# Patient Record
Sex: FEMALE | Race: OTHER | HISPANIC OR LATINO | Employment: UNEMPLOYED | URBAN - METROPOLITAN AREA
[De-identification: names, ages, dates, MRNs, and addresses within clinical notes are randomized per-mention and may not be internally consistent; named-entity substitution may affect disease eponyms.]

---
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
## 2021-02-04 ENCOUNTER — TELEPHONE (OUTPATIENT)
Dept: PEDIATRICS CLINIC | Facility: CLINIC | Age: 21
End: 2021-02-04

## 2021-02-16 NOTE — TELEPHONE ENCOUNTER
02/15/21 7:41 PM     Thank you for your request  Your request has been received, reviewed, and the patient chart updated  The PCP has successfully been removed with a patient attribution note  This message will now be completed      Thank you  Izabella Jacome

## 2022-11-01 ENCOUNTER — APPOINTMENT (OUTPATIENT)
Dept: LAB | Facility: CLINIC | Age: 22
End: 2022-11-01

## 2022-11-01 ENCOUNTER — OFFICE VISIT (OUTPATIENT)
Dept: FAMILY MEDICINE CLINIC | Facility: CLINIC | Age: 22
End: 2022-11-01

## 2022-11-01 VITALS
SYSTOLIC BLOOD PRESSURE: 120 MMHG | RESPIRATION RATE: 18 BRPM | DIASTOLIC BLOOD PRESSURE: 80 MMHG | HEIGHT: 61 IN | OXYGEN SATURATION: 98 % | WEIGHT: 270.6 LBS | BODY MASS INDEX: 51.09 KG/M2 | TEMPERATURE: 97.8 F | HEART RATE: 97 BPM

## 2022-11-01 DIAGNOSIS — Z76.89 ENCOUNTER TO ESTABLISH CARE: Primary | ICD-10-CM

## 2022-11-01 DIAGNOSIS — Z01.818 PRE-OP EXAMINATION: ICD-10-CM

## 2022-11-01 DIAGNOSIS — N92.6 IRREGULAR MENSES: ICD-10-CM

## 2022-11-01 DIAGNOSIS — E66.01 CLASS 3 SEVERE OBESITY DUE TO EXCESS CALORIES WITHOUT SERIOUS COMORBIDITY WITH BODY MASS INDEX (BMI) OF 50.0 TO 59.9 IN ADULT (HCC): ICD-10-CM

## 2022-11-01 DIAGNOSIS — Z11.4 SCREENING FOR HIV (HUMAN IMMUNODEFICIENCY VIRUS): ICD-10-CM

## 2022-11-01 DIAGNOSIS — Z11.59 NEED FOR HEPATITIS C SCREENING TEST: ICD-10-CM

## 2022-11-01 DIAGNOSIS — N92.6 MISSED MENSES: ICD-10-CM

## 2022-11-01 LAB
ALBUMIN SERPL BCP-MCNC: 3.3 G/DL (ref 3.5–5)
ALP SERPL-CCNC: 74 U/L (ref 46–116)
ALT SERPL W P-5'-P-CCNC: 29 U/L (ref 12–78)
ANION GAP SERPL CALCULATED.3IONS-SCNC: 5 MMOL/L (ref 4–13)
AST SERPL W P-5'-P-CCNC: 16 U/L (ref 5–45)
BASOPHILS # BLD AUTO: 0.02 THOUSANDS/ÂΜL (ref 0–0.1)
BASOPHILS NFR BLD AUTO: 0 % (ref 0–1)
BILIRUB SERPL-MCNC: 0.24 MG/DL (ref 0.2–1)
BUN SERPL-MCNC: 8 MG/DL (ref 5–25)
CALCIUM ALBUM COR SERPL-MCNC: 9.4 MG/DL (ref 8.3–10.1)
CALCIUM SERPL-MCNC: 8.8 MG/DL (ref 8.3–10.1)
CHLORIDE SERPL-SCNC: 109 MMOL/L (ref 96–108)
CHOLEST SERPL-MCNC: 140 MG/DL
CO2 SERPL-SCNC: 26 MMOL/L (ref 21–32)
CREAT SERPL-MCNC: 0.73 MG/DL (ref 0.6–1.3)
EOSINOPHIL # BLD AUTO: 0.12 THOUSAND/ÂΜL (ref 0–0.61)
EOSINOPHIL NFR BLD AUTO: 2 % (ref 0–6)
ERYTHROCYTE [DISTWIDTH] IN BLOOD BY AUTOMATED COUNT: 14.8 % (ref 11.6–15.1)
EST. AVERAGE GLUCOSE BLD GHB EST-MCNC: 114 MG/DL
GFR SERPL CREATININE-BSD FRML MDRD: 118 ML/MIN/1.73SQ M
GLUCOSE P FAST SERPL-MCNC: 79 MG/DL (ref 65–99)
HBA1C MFR BLD: 5.6 %
HCT VFR BLD AUTO: 41.6 % (ref 34.8–46.1)
HCV AB SER QL: NORMAL
HDLC SERPL-MCNC: 46 MG/DL
HGB BLD-MCNC: 12.9 G/DL (ref 11.5–15.4)
IMM GRANULOCYTES # BLD AUTO: 0.02 THOUSAND/UL (ref 0–0.2)
IMM GRANULOCYTES NFR BLD AUTO: 0 % (ref 0–2)
LDLC SERPL CALC-MCNC: 75 MG/DL (ref 0–100)
LYMPHOCYTES # BLD AUTO: 2.1 THOUSANDS/ÂΜL (ref 0.6–4.47)
LYMPHOCYTES NFR BLD AUTO: 32 % (ref 14–44)
MCH RBC QN AUTO: 25 PG (ref 26.8–34.3)
MCHC RBC AUTO-ENTMCNC: 31 G/DL (ref 31.4–37.4)
MCV RBC AUTO: 81 FL (ref 82–98)
MONOCYTES # BLD AUTO: 0.67 THOUSAND/ÂΜL (ref 0.17–1.22)
MONOCYTES NFR BLD AUTO: 10 % (ref 4–12)
NEUTROPHILS # BLD AUTO: 3.65 THOUSANDS/ÂΜL (ref 1.85–7.62)
NEUTS SEG NFR BLD AUTO: 56 % (ref 43–75)
NONHDLC SERPL-MCNC: 94 MG/DL
NRBC BLD AUTO-RTO: 0 /100 WBCS
PLATELET # BLD AUTO: 407 THOUSANDS/UL (ref 149–390)
PMV BLD AUTO: 10.2 FL (ref 8.9–12.7)
POTASSIUM SERPL-SCNC: 3.9 MMOL/L (ref 3.5–5.3)
PROT SERPL-MCNC: 7.9 G/DL (ref 6.4–8.4)
RBC # BLD AUTO: 5.15 MILLION/UL (ref 3.81–5.12)
SL AMB POCT URINE HCG: NEGATIVE
SODIUM SERPL-SCNC: 140 MMOL/L (ref 135–147)
T4 FREE SERPL-MCNC: 0.93 NG/DL (ref 0.76–1.46)
TRIGL SERPL-MCNC: 95 MG/DL
TSH SERPL DL<=0.05 MIU/L-ACNC: 4.58 UIU/ML (ref 0.45–4.5)
WBC # BLD AUTO: 6.58 THOUSAND/UL (ref 4.31–10.16)

## 2022-11-01 RX ORDER — ACETAMINOPHEN 500 MG
500 TABLET ORAL EVERY 6 HOURS PRN
COMMUNITY

## 2022-11-01 NOTE — ASSESSMENT & PLAN NOTE
Pt pursuing gastric sleeve procedure with Dr Xavier Ornelas on 11/28/2022    - No known contraindications for surgery  - Complete ordered labs today

## 2022-11-01 NOTE — PROGRESS NOTES
FAMILY PRACTICE PRE-OPERATIVE EVALUATION  [facility] PHYSICIAN [facility] PRACTICE [facility]     NAME: Christos Coats  AGE: 25 y o  SEX: female  : 2000                 DATE: 2022     Family Practice Pre-Operative Evaluation      Chief Complaint: Pre-operative Evaluation     Surgery: Laparoscopic Sleeve Gastrectomy  Anticipated Date of Surgery: 2022  Referring Provider: Zuleyma Werner MD     History of Present Illness:      Christos Coats is a 25 y o  female who presents to the office today for a preoperative consultation at the request of surgeon, Zuleyma Werner MD     This is the pt's first visit to this office  She was previously following with the [facility]  Pt is scheduled for a laparoscopic sleeve gastrectomy procedure  Planned anesthesia is general  Patient has a bleeding risk of: no recent abnormal bleeding, no remote history of abnormal bleeding and no use of Ca-channel blockers  Patient does not have objections to receiving blood products if needed  Current anti-platelet/anti-coagulation medications that the patient is prescribed includes: none  Pt voices no concerns regarding the procedure, but mentions that her periods are very infrequent, about 100 day cycles according to her phone [place]  Last reported sexual intercourse in March of this year, POCT HCG in office today was negative        Assessment of Cardiac Risk:  · Denies unstable or severe angina or MI in the last 6 weeks or history of stent placement in the last year   · Denies decompensated heart failure (e g  New onset heart failure, NYHA functional class IV heart failure, or worsening existing heart failure)  · Denies significant arrhythmias such as high grade AV block, symptomatic ventricular arrhythmia, newly recognized ventricular tachycardia, supraventricular tachycardia with resting heart rate >100, or symptomatic bradycardia  · Denies severe heart valve disease including aortic stenosis or symptomatic mitral stenosis     Exercise Capacity:  · Able to walk 4 blocks without symptoms?: Yes  · Able to walk 2 flights without symptoms?: Yes     Prior Anesthesia Reactions: No     Personal history of venous thromboembolic disease? No     History of steroid use for >2 weeks within last year? No           Review of Systems:      Review of Systems   Constitutional: Negative for chills, fatigue, fever and unexpected weight change  HENT: Negative for trouble swallowing  Eyes: Negative for visual disturbance  Respiratory: Negative for cough, chest tightness, shortness of breath and wheezing  Cardiovascular: Negative for chest pain, palpitations and leg swelling  Gastrointestinal: Negative for abdominal pain, blood in stool, constipation, diarrhea, nausea and vomiting  Endocrine: Negative for polyuria  Genitourinary: Positive for menstrual problem  Negative for difficulty urinating and dysuria  Allergic/Immunologic: Negative for environmental allergies  Neurological: Negative for dizziness, seizures, syncope, weakness, numbness and headaches  Psychiatric/Behavioral: Negative for dysphoric mood and sleep disturbance  The patient is not nervous/anxious  All other systems reviewed and are negative         Current Problem List:      Active Ambulatory Problems     Diagnosis Date Noted   • Childhood obesity 04/06/2016   • Irregular menses 11/01/2022   • Class 3 severe obesity due to excess calories without serious comorbidity with body mass index (BMI) of 50 0 to 59 9 in adult [facility]) 11/01/2022   • Pre-op examination 11/01/2022     Resolved Ambulatory Problems     Diagnosis Date Noted   • No Resolved Ambulatory Problems     Past Medical History:   Diagnosis Date   • Fatty liver           Allergies:      No Known Allergies       Current Medications:        Current Outpatient Medications:   •  acetaminophen (TYLENOL) 500 mg tablet, Take 500 mg by mouth every 6 (six) hours as needed for mild pain, Disp: , Rfl:   •  BIOTIN PO, Take by mouth, Disp: , Rfl:          Past Medical History:     Past Medical History:   Diagnosis Date   • Fatty liver          Past Surgical History:   Procedure Laterality Date   • UPPER GASTROINTESTINAL ENDOSCOPY           Family History   Problem Relation Age of Onset   • No Known Problems Mother    • No Known Problems Father    • No Known Problems Sister    • Diabetes Maternal Grandmother    • Diabetes Cousin         Social History     Socioeconomic History   • Marital status: Single     Spouse name: None   • Number of children: 0   • Years of education: None   • Highest education level: None   Occupational History   • None   Tobacco Use   • Smoking status: Never Smoker   • Smokeless tobacco: Never Used   Vaping Use   • Vaping Use: Never used   Substance and Sexual Activity   • Alcohol use: Yes     Comment: rarely   • Drug use: Never   • Sexual activity: Not Currently     Partners: Male     Birth control/protection: Condom Male   Other Topics Concern   • None   Social History Narrative   • None     Social Determinants of Health     Financial Resource Strain: Low Risk    • Difficulty of Paying Living Expenses: Not hard at all   Food Insecurity: No Food Insecurity   • Worried About Running Out of Food in the Last Year: Never true   • Ran Out of Food in the Last Year: Never true   Transportation Needs: No Transportation Needs   • Lack of Transportation (Medical): No   • Lack of Transportation (Non-Medical):  No   Physical Activity: Not on file   Stress: Not on file   Social Connections: Not on file   Intimate Partner Violence: Not on file   Housing Stability: Not on file          Physical Exam:      Visit Vitals  /80 (BP Location: Left arm, Patient Position: Sitting, Cuff Size: Standard)   Pulse 97   Temp 97 8 °F (36 6 °C) (Temporal)   Resp 18   Ht 5' 1" (1 549 m)   Wt 123 kg (270 lb 9 6 oz)   LMP 06/15/2022   SpO2 98%   BMI 51 13 kg/m²   OB Status Having periods Smoking Status Never Smoker   BSA 2 15 m²       Physical Exam  Vitals reviewed  Constitutional:       General: She is not in acute distress  Appearance: She is morbidly obese  She is not ill-appearing or diaphoretic  HENT:      Head: Normocephalic and atraumatic  Right Ear: Tympanic membrane, ear canal and external ear normal       Left Ear: Tympanic membrane, ear canal and external ear normal       Nose: Nose normal       Mouth/Throat:      Mouth: Mucous membranes are moist       Pharynx: Oropharynx is clear  Eyes:      General: Lids are normal       Conjunctiva/sclera: Conjunctivae normal       Pupils: Pupils are equal, round, and reactive to light  Neck:      Thyroid: No thyromegaly or thyroid tenderness  Cardiovascular:      Rate and Rhythm: Normal rate and regular rhythm  Pulses: Normal pulses  Heart sounds: Normal heart sounds  No murmur heard  Pulmonary:      Effort: Pulmonary effort is normal  No tachypnea  Breath sounds: Normal breath sounds  No decreased breath sounds or wheezing  Abdominal:      General: Bowel sounds are normal  There is no distension  Palpations: Abdomen is soft  Tenderness: There is no abdominal tenderness  There is no guarding  Musculoskeletal:      Right lower leg: No edema  Left lower leg: No edema  Lymphadenopathy:      Cervical: No cervical adenopathy  Skin:     General: Skin is warm and dry  Neurological:      Mental Status: She is alert and oriented to person, place, and time  Psychiatric:         Mood and Affect: Mood and affect normal          Speech: Speech normal          Behavior: Behavior normal          Thought Content: Thought content normal           Data:      Pre-operative work-up     Laboratory Results: I personally reviewed the laboratory results       EKG: none available for review     Chest x-ray: none available for review     Previous cardiopulmonary studies within the past year:  · Echocardiogram: none   · Cardiac Catheterization: none  · Stress Test: none  · Pulmonary Function Testing: none      Assessment & Recommendations:      Problem List Items Addressed This Visit        Other    Irregular menses     Pt reports periods typically at around 100 day intervals  Last period in June  - POCT HCG negative  - Referral to OB/GYN  Relevant Orders    Ambulatory Referral to Obstetrics / Gynecology    Class 3 severe obesity due to excess calories without serious comorbidity with body mass index (BMI) of 50 0 to 59 9 in adult [facility])     Body mass index is 51 13 kg/m²  Pt meeting with nutritionist and scheduled for gastric sleeve procedure on 11/28/2022    - Healthy diet, daily physical activity recommended  - HgbA1c, lipids, TSH         Relevant Orders    Hemoglobin A1C    Lipid panel    TSH, 3rd generation with Free T4 reflex    Pre-op examination     Pt pursuing gastric sleeve procedure with Dr Giuliano Arizmendi on 11/28/2022    - No known contraindications for surgery  - Complete ordered labs today  Relevant Orders    CBC and differential    Comprehensive metabolic panel      Other Visit Diagnoses     Encounter to establish care    -  Primary    Missed menses        Relevant Orders    Ambulatory Referral to Obstetrics / Gynecology    POCT urine HCG (Completed)    Screening for HIV (human immunodeficiency virus)        Relevant Orders    HIV 1/2 Antigen/Antibody (4th Generation) w Reflex [facility]    Need for hepatitis C screening test        Relevant Orders    Hepatitis C Antibody ([facility])           Pre-Op Evaluation Assessment  25 y o  female with planned surgery: laparoscopic sleeve gastrectomy  Known risk factors for perioperative complications: obesity  Current medications which may produce withdrawal symptoms if withheld perioperatively: none  Pre-Op Evaluation Plan  1  Further preoperative workup as follows: CBC/diff, CMP, HgbA1c     2   Medication Management/Recommendations: - Patient has been instructed to avoid herbs or non-directed vitamins the week prior to surgery to ensure no drug interactions with perioperative surgical and anesthetic medications  - Patient has been instructed to avoid aspirin containing medications or non-steroidal anti-inflammatory drugs for the week preceding surgery  3  Prophylaxis for cardiac events with perioperative beta-blockers: not indicated  4  Patient requires further consultation with: none     Clearance    Patient is CLEARED for surgery without any additional cardiac testing

## 2022-11-01 NOTE — ASSESSMENT & PLAN NOTE
Pt reports periods typically at around 100 day intervals  Last period in June  - POCT HCG negative  - Referral to OB/GYN

## 2022-11-01 NOTE — ASSESSMENT & PLAN NOTE
Body mass index is 51 13 kg/m²  Pt meeting with nutritionist and scheduled for gastric sleeve procedure on 11/28/2022    - Healthy diet, daily physical activity recommended    - HgbA1c, lipids, TSH

## 2022-11-02 DIAGNOSIS — R79.89 ELEVATED TSH: ICD-10-CM

## 2022-11-02 DIAGNOSIS — R79.89 ABNORMAL CBC: Primary | ICD-10-CM

## 2022-11-02 LAB
FERRITIN SERPL-MCNC: 40 NG/ML (ref 8–388)
HIV 1+2 AB+HIV1 P24 AG SERPL QL IA: NORMAL
IRON SATN MFR SERPL: 15 % (ref 15–50)
IRON SERPL-MCNC: 46 UG/DL (ref 50–170)
TIBC SERPL-MCNC: 311 UG/DL (ref 250–450)

## 2022-11-06 DIAGNOSIS — R79.0 ABNORMAL BLOOD LEVEL OF IRON: ICD-10-CM

## 2022-11-06 DIAGNOSIS — R79.89 ABNORMAL CBC: ICD-10-CM

## 2022-11-06 DIAGNOSIS — R79.89 ELEVATED TSH: Primary | ICD-10-CM

## 2022-11-18 ENCOUNTER — OFFICE VISIT (OUTPATIENT)
Dept: OBGYN CLINIC | Facility: CLINIC | Age: 22
End: 2022-11-18

## 2022-11-18 ENCOUNTER — APPOINTMENT (OUTPATIENT)
Dept: LAB | Facility: CLINIC | Age: 22
End: 2022-11-18

## 2022-11-18 VITALS
SYSTOLIC BLOOD PRESSURE: 136 MMHG | HEIGHT: 61 IN | DIASTOLIC BLOOD PRESSURE: 84 MMHG | HEART RATE: 89 BPM | WEIGHT: 271.4 LBS | BODY MASS INDEX: 51.24 KG/M2

## 2022-11-18 DIAGNOSIS — N92.6 IRREGULAR MENSES: ICD-10-CM

## 2022-11-18 DIAGNOSIS — R79.89 ELEVATED TSH: ICD-10-CM

## 2022-11-18 DIAGNOSIS — R79.89 ABNORMAL CBC: ICD-10-CM

## 2022-11-18 DIAGNOSIS — R79.0 ABNORMAL BLOOD LEVEL OF IRON: ICD-10-CM

## 2022-11-18 DIAGNOSIS — Z13.31 POSITIVE DEPRESSION SCREENING: Primary | ICD-10-CM

## 2022-11-18 PROBLEM — E66.01 CLASS 3 SEVERE OBESITY DUE TO EXCESS CALORIES WITHOUT SERIOUS COMORBIDITY WITH BODY MASS INDEX (BMI) OF 50.0 TO 59.9 IN ADULT (HCC): Status: RESOLVED | Noted: 2022-11-01 | Resolved: 2022-11-18

## 2022-11-18 PROBLEM — Z01.818 PRE-OP EXAMINATION: Status: RESOLVED | Noted: 2022-11-01 | Resolved: 2022-11-18

## 2022-11-18 LAB
B-HCG SERPL-ACNC: <2 MIU/ML
FSH SERPL-ACNC: 7.3 MIU/ML
PROLACTIN SERPL-MCNC: 7.4 NG/ML

## 2022-11-18 NOTE — PROGRESS NOTES
PROBLEM GYNECOLOGICAL VISIT    Inocencio Loya is a 25 y o  female who presents today with complaint of irregular menses  Her general medical history has been reviewed and she reports it as follows:    Past Medical History:   Diagnosis Date   • Fatty liver      Past Surgical History:   Procedure Laterality Date   • UPPER GASTROINTESTINAL ENDOSCOPY  10/2022     OB History        0    Para   0    Term   0       0    AB   0    Living   0       SAB   0    IAB   0    Ectopic   0    Multiple   0    Live Births   0               Social History     Tobacco Use   • Smoking status: Former     Types: Pipe     Start date:      Quit date: 2022     Years since quittin 2   • Smokeless tobacco: Never   Vaping Use   • Vaping Use: Never used   Substance Use Topics   • Alcohol use: Yes     Comment: 8 drinks per weekend, once per month   • Drug use: Never     Social History     Substance and Sexual Activity   Sexual Activity Not Currently   • Partners: Male   • Birth control/protection: Condom Male       Current Outpatient Medications   Medication Instructions   • BIOTIN PO Oral       History of Present Illness:   Reports menarche age 16 and menses were always very regular w/ duration 3-4 days with moderate flow until 2 years ago when her menses became irregular with menses anywhere from every 3 weeks to 4 months and duration varying from 5-14 days  However, she reports since 2022 she has had NO menses at all  She is scheduled for gastric sleeve surgery on 2022  On 2022 she had bloodwork performed per PCP and TSH was slightly elevated at 4 580, but free T4 was normal at 0 93  She reports that she has not been sexually active since 3/2022  Review of Systems:  Review of Systems   Constitutional: Negative  Gastrointestinal: Negative  Genitourinary: Positive for menstrual problem  Negative for pelvic pain and vaginal bleeding         Physical Exam:  /84   Pulse 89   Ht 5' 1" (1 549 m)   Wt 123 kg (271 lb 6 4 oz)   LMP 06/15/2022 (Exact Date)   BMI 51 28 kg/m²   Physical Exam  Constitutional:       General: She is not in acute distress  Neurological:      Mental Status: She is alert  Skin:     General: Skin is warm and dry  Vitals reviewed  Assessment:   1  Irregular menses  Plan:   1  Imaging ordered: pelvic ultrasound  2  Bloodwork ordered: FSH, prolactin, testosterone  3  Return to office 1 week after ultrasound to review results  4  Patient's depression screening was assessed with a PHQ-2 score of 2  Their PHQ-9 score was 11  Patient assessed for underlying major depression  They have no active suicidal ideations  Brief counseling provided and recommend additional follow-up/re-evaluation next office visit  Referral placed for  consultation  Reviewed with patient that [facility], but that they will not necessarily be reviewed by me immediately  Explained that I will review results at my earliest opportunity and contact patient appropriately

## 2022-11-18 NOTE — PATIENT INSTRUCTIONS
Thank you for your confidence in our team    We appreciate you and welcome your feedback  If you receive a survey from us, please take a few moments to let us know how we are doing     Sincerely,  MARY Martinez

## 2022-11-22 ENCOUNTER — PATIENT OUTREACH (OUTPATIENT)
Dept: OBGYN CLINIC | Facility: CLINIC | Age: 22
End: 2022-11-22

## 2022-12-02 ENCOUNTER — PATIENT OUTREACH (OUTPATIENT)
Dept: OBGYN CLINIC | Facility: CLINIC | Age: 22
End: 2022-12-02

## 2022-12-02 NOTE — PROGRESS NOTES
RAFFI STEINER outreached pt today in regards to her high depression screen  Pt denies any SI/HI  Pt reports things have been hard lately, she and her family have been homeless since august, they were at a shelter, but are now staying with one of her sisters, the were connected to many services and are looking for a new home  Pt reports that her mom was a homeowner but fell behind on tax payments and the house went into forclosure, this has been difficult for her mother who is a single mom of 5 to adjust to going from a homeowner to homeless to a potential renter when she had owned her own home for 10 years  Pt attends [employer] and 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

## 2022-12-05 ENCOUNTER — TELEPHONE (OUTPATIENT)
Dept: FAMILY MEDICINE CLINIC | Facility: CLINIC | Age: 22
End: 2022-12-05

## 2022-12-16 ENCOUNTER — PATIENT OUTREACH (OUTPATIENT)
Dept: OBGYN CLINIC | Facility: CLINIC | Age: 22
End: 2022-12-16

## 2022-12-23 ENCOUNTER — PATIENT OUTREACH (OUTPATIENT)
Dept: OBGYN CLINIC | Facility: CLINIC | Age: 22
End: 2022-12-23

## 2023-01-06 ENCOUNTER — PATIENT OUTREACH (OUTPATIENT)
Dept: OBGYN CLINIC | Facility: CLINIC | Age: 23
End: 2023-01-06

## 2023-01-06 NOTE — PROGRESS NOTES
No responses to VM left for pt to see if she was able to connect with therapy services, SW CM will close this encounter at this time